# Patient Record
Sex: MALE | Race: WHITE | NOT HISPANIC OR LATINO | Employment: UNEMPLOYED | URBAN - NONMETROPOLITAN AREA
[De-identification: names, ages, dates, MRNs, and addresses within clinical notes are randomized per-mention and may not be internally consistent; named-entity substitution may affect disease eponyms.]

---

## 2024-04-01 ENCOUNTER — EVALUATION (OUTPATIENT)
Dept: PHYSICAL THERAPY | Facility: HOSPITAL | Age: 24
End: 2024-04-01
Payer: COMMERCIAL

## 2024-04-01 DIAGNOSIS — M65.9 SYNOVITIS AND TENOSYNOVITIS, UNSPECIFIED: ICD-10-CM

## 2024-04-01 DIAGNOSIS — G89.29 CHRONIC PAIN OF RIGHT ANKLE: Primary | ICD-10-CM

## 2024-04-01 DIAGNOSIS — M25.571 CHRONIC PAIN OF RIGHT ANKLE: Primary | ICD-10-CM

## 2024-04-01 PROCEDURE — 97110 THERAPEUTIC EXERCISES: CPT | Mod: GP

## 2024-04-01 PROCEDURE — 97162 PT EVAL MOD COMPLEX 30 MIN: CPT | Mod: GP

## 2024-04-01 ASSESSMENT — ENCOUNTER SYMPTOMS
LOSS OF SENSATION IN FEET: 0
DEPRESSION: 0
OCCASIONAL FEELINGS OF UNSTEADINESS: 0

## 2024-04-01 ASSESSMENT — PAIN - FUNCTIONAL ASSESSMENT: PAIN_FUNCTIONAL_ASSESSMENT: 0-10

## 2024-04-01 ASSESSMENT — PAIN SCALES - GENERAL: PAINLEVEL_OUTOF10: 0 - NO PAIN

## 2024-04-01 NOTE — PROGRESS NOTES
Physical Therapy    Physical Therapy Evaluation and Treatment      Patient Name: Jose Mcpherson  MRN: [id number]  Today's Date: 4/1/2024  Time Calculation  Start Time: 1430  Stop Time: 1509  Time Calculation (min): 39 min    Assessment:  PT Assessment  PT Assessment Results: Impaired balance, Decreased strength, Obesity, Pain  Rehab Prognosis: Fair  Evaluation/Treatment Tolerance: Patient tolerated treatment well  Assessment Comment: Patient presenting with higher level strength and balance deficcits of the R ankle. He will benefit from targeted active therapy program to improve tolerance of weightbearing positions.     Plan:  OP PT Plan  Treatment/Interventions: Education/ Instruction, Neuromuscular re-education, Therapeutic activities, Therapeutic exercises  PT Plan: Skilled PT  PT Frequency: 2 times per week  Duration: 4 wks  Plan of Care Agreement: Patient    Current Problem:   1. Chronic pain of right ankle        2. Synovitis and tenosynovitis, unspecified  Referral to Physical Therapy          Subjective    General:  General  Reason for Referral: Eval and treat 2/2 R ankle sprain of 2 years duration.  Referred By: Trina Garrido DPM  Past Medical History Relevant to Rehab: Patient had inversion sprain playing football 2 years ago with typical healing. After he obtained a job he noted some pain in the R lateral ankle with prolonged standing and walking. He sought attention from podiatry and has had a brace and arch supports added. (no longer uses brace). He presents today for an adjunctive strengthening program.  General Comment: Currently unemployed but looking for work.    Precautions:  Precautions  STEADI Fall Risk Score (The score of 4 or more indicates an increased risk of falling): 0    Pain:  Pain Assessment  Pain Assessment: 0-10  Pain Score: 0 - No pain (has no pain until he has been standing a few hours.)  Effect of Pain on Daily Activities: Decreased standing tolerance.    Prior Level of Function:    I    Objective      General Assessments:   Unilateral balance limited to 3 seconds (bilaterally)     Extremity/Trunk Assessments:    ANKLE    Functional Rating Scale     Observation   Pes planus posture. Obese patient.    Ankle Palpation/Joint Mobility Assessment   NTTP R ATFL    Ankle AROM   Full AROM    Ankle PROM   Full PROM    Ankle MMT   4+/5 all MMT    Special Tests   Negative varus stress     Gait   Amb to dept without device, non antalgic, no asymmetry appreciated.    Flexibility   Good gastroc/soleus flexibility.    Outcome Measures:   LEFS: 75!    Treatments:   Patient started on isometric inv/ever     EDUCATION:  Outpatient Education  Education Provided: Anatomy, Body Mechanics, Home Exercise Program (Started on isometric inversion and eversion of the R ankle.)  Risk and Benefits Discussed with Patient/Caregiver/Other: yes  Patient Response to Education: Patient/Caregiver Verbalized Understanding of Information    Goals:  Active       PT Problem       The patient will ambulate 10 minutes continuously without pain increasing to greater than 1/10 to allow return to required community ambulation tasks.        Start:  04/01/24    Expected End:  05/01/24            The patient will improve their time in unipedal stance to >= 10 sec to demonstrate increased balance with it's commensurate improvement in gait safety.        Start:  04/01/24    Expected End:  05/01/24            Patient will perform 10 reps unilateral calf raise to demonstrate functional strength of ankle musculature.       Start:  04/01/24    Expected End:  05/01/24            Increase LEFS to 80       Start:  04/01/24    Expected End:  05/01/24

## 2024-04-01 NOTE — LETTER
April 1, 2024    Trina Garrido DPM  [address] [facility] [address]    Patient: Jose Mcpherson   YOB: 2000   Date of Visit: 4/1/2024       Dear Trina Garrido DPM  [address] [place] [address]    The attached plan of care is being sent to you because your patient’s medical reimbursement requires that you certify the plan of care. Your signature is required to allow uninterrupted insurance coverage.      You may indicate your approval by signing below and faxing this form back to us at Dept Fax: [phone].    Please call Dept: [phone] with any questions or concerns.    Thank you for this referral,        Juan Francis PT  [facility]  [address] [facility] [id number] [phone]    Payer: Payor: [employer] PLAN / Plan: [facility] / Product Type: *No Product type* /                                                                         Date:     Dear Juan Francis PT,     Re: Mr. Jose Mcpherson, MRN:[id number]    I certify that I have reviewed the attached plan of care and it is medically necessary for Mr. Jose Mcpherson (2000) who is under my care.          ______________________________________                    _________________  Provider name and credentials                                           Date and time                                                                                           Plan of Care 4/1/24   Effective from: 4/1/2024  Effective to: 5/1/2024    Plan ID: [id number]            Participants as of Finalize on 4/1/2024    Name Type Comments Contact Info    Trina Garrido DPM Referring Provider  [phone]    Juan Francis PT Physical Therapist  [phone]       Last Plan Note     Author: uJan Francis PT Status: Incomplete Last edited: 4/1/2024  2:30 PM       Physical Therapy    Physical Therapy Evaluation and Treatment      Patient Name: Jose LU  Vida  MRN: [id number]  Today's Date: 4/1/2024  Time Calculation  Start Time: 1430  Stop Time: 1509  Time Calculation (min): 39 min    Assessment:  PT Assessment  PT Assessment Results: Impaired balance, Decreased strength, Obesity, Pain  Rehab Prognosis: Fair  Evaluation/Treatment Tolerance: Patient tolerated treatment well  Assessment Comment: Patient presenting with higher level strength and balance deficcits of the R ankle. He will benefit from targeted active therapy program to improve tolerance of weightbearing positions.     Plan:  OP PT Plan  Treatment/Interventions: Education/ Instruction, Neuromuscular re-education, Therapeutic activities, Therapeutic exercises  PT Plan: Skilled PT  PT Frequency: 2 times per week  Duration: 4 wks  Plan of Care Agreement: Patient    Current Problem:   1. Chronic pain of right ankle        2. Synovitis and tenosynovitis, unspecified  Referral to Physical Therapy          Subjective   General:  General  Reason for Referral: Eval and treat 2/2 R ankle sprain of 2 years duration.  Referred By: Trina Garrido DPM  Past Medical History Relevant to Rehab: Patient had inversion sprain playing football 2 years ago with typical healing. After he obtained a job he noted some pain in the R lateral ankle with prolonged standing and walking. He sought attention from podiatry and has had a brace and arch supports added. (no longer uses brace). He presents today for an adjunctive strengthening program.  General Comment: Currently unemployed but looking for work.    Precautions:  Precautions  STEADI Fall Risk Score (The score of 4 or more indicates an increased risk of falling): 0    Pain:  Pain Assessment  Pain Assessment: 0-10  Pain Score: 0 - No pain (has no pain until he has been standing a few hours.)  Effect of Pain on Daily Activities: Decreased standing tolerance.    Prior Level of Function:   I    Objective     General Assessments:   Unilateral balance limited to 3 seconds  (bilaterally)     Extremity/Trunk Assessments:    ANKLE    Functional Rating Scale     Observation   Pes planus posture. Obese patient.    Ankle Palpation/Joint Mobility Assessment   NTTP R ATFL    Ankle AROM   Full AROM    Ankle PROM   Full PROM    Ankle MMT   4+/5 all MMT    Special Tests   Negative varus stress     Gait   Amb to dept without device, non antalgic, no asymmetry appreciated.    Flexibility   Good gastroc/soleus flexibility.    Outcome Measures:   LEFS: 75!    Treatments:   Patient started on isometric inv/ever     EDUCATION:  Outpatient Education  Education Provided: Anatomy, Body Mechanics, Home Exercise Program (Started on isometric inversion and eversion of the R ankle.)  Risk and Benefits Discussed with Patient/Caregiver/Other: yes  Patient Response to Education: Patient/Caregiver Verbalized Understanding of Information    Goals:  Active       PT Problem       The patient will ambulate 10 minutes continuously without pain increasing to greater than 1/10 to allow return to required community ambulation tasks.        Start:  04/01/24    Expected End:  05/01/24            The patient will improve their time in unipedal stance to >= 10 sec to demonstrate increased balance with it's commensurate improvement in gait safety.        Start:  04/01/24    Expected End:  05/01/24            Patient will perform 10 reps unilateral calf raise to demonstrate functional strength of ankle musculature.       Start:  04/01/24    Expected End:  05/01/24            Increase LEFS to 80       Start:  04/01/24    Expected End:  05/01/24                           Current Participants as of 4/1/2024    Name Type Comments Contact Info    Trina Garrido DPM Referring Provider  [phone]    Signature pending    Juan Franics PT Physical Therapist  [phone]

## 2024-04-03 ENCOUNTER — TREATMENT (OUTPATIENT)
Dept: PHYSICAL THERAPY | Facility: HOSPITAL | Age: 24
End: 2024-04-03
Payer: COMMERCIAL

## 2024-04-03 DIAGNOSIS — G89.29 CHRONIC PAIN OF RIGHT ANKLE: ICD-10-CM

## 2024-04-03 DIAGNOSIS — M25.571 CHRONIC PAIN OF RIGHT ANKLE: ICD-10-CM

## 2024-04-03 PROCEDURE — 97110 THERAPEUTIC EXERCISES: CPT | Mod: GP,CQ

## 2024-04-03 ASSESSMENT — PAIN - FUNCTIONAL ASSESSMENT: PAIN_FUNCTIONAL_ASSESSMENT: 0-10

## 2024-04-03 ASSESSMENT — PAIN SCALES - GENERAL: PAINLEVEL_OUTOF10: 0 - NO PAIN

## 2024-04-03 NOTE — PROGRESS NOTES
Physical Therapy Treatment    Patient Name: Jose Mcpherson  MRN: [id number]  Today's Date: 4/3/2024  Time Calculation  Start Time: 1430  Stop Time: 1510  Time Calculation (min): 40 min        PT Therapeutic Procedures Time Entry  Therapeutic Exercise Time Entry: 40                Insurance:  Visit number: 2 of 8  Authorization info: 20 authorized   Insurance Type: [facility]           Current Problem  1. Chronic pain of right ankle  Follow Up In Physical Therapy          General  Referred By: Trina Garrido DPM      Subjective   Current Condition:   Same  Patient reports that when he is walking on unsteady ground he notices his ankle wants to give out, but doesn't. Will also have more pain when it is shifting to steady.     Performing HEP?: Partially- did not have a ball to do iso metrics, but trying to walk more     Precautions  Precautions  Medical Precautions: No known precautions/limitation  Pain  Pain Assessment: 0-10  Pain Score: 0 - No pain        Treatments:     Pro cycle x10 min with focus on leading rotational movements with ankle joint, seated isolated strengthening with YTB DF/PF/INV/EV 2x10 each, gentle gastroc stretch 5x15 sec, standing calf raises with UE support 2x10, standing toe raises with UE support 2x10, static SLS trials with no UE support, side stepping with each LE leading 4x25ft, ended with ABC ROM        EDUCATION:   Individual(s) Educated: Patient   Education Provided: Body mechanics   Handout(s) Provided: Scanned into chart  Home Program: In previous notes- updated with resistance bands (scanned into chart)  Risk and Benefits Discussed with Patient/Caregiver/Other: Yes   Patient/Caregiver Demonstrated Understanding: Yes   Patient Response to Education: Patient/Caregiver verbalized understanding of information and Patient/Caregiver performed return demonstration of exercises/activities    Assessment:   Patient was able to perform all strengthening and mobility exercises  "well. Reports occasional stiffness and \"clicking\", but no reports of pain. Able to tell his muscles were working. Most issues reported along lateral portion of ankle.         Plan:  Continue with POC and as patient tolerated  Frequency: 2 x Week  Duration: 4 Weeks        Access Code: [email or web address]  URL: [email or web address]  Date: 04/03/2024  Prepared by: Kirsten Caballero    Exercises  - Ankle Dorsiflexion with Resistance  - 1 x daily - 7 x weekly - 3 sets - 10 reps  - Ankle Eversion with Resistance  - 1 x daily - 7 x weekly - 3 sets - 10 reps  - Ankle Inversion with Resistance  - 1 x daily - 7 x weekly - 3 sets - 10 reps  - Ankle and Toe Plantarflexion with Resistance  - 1 x daily - 7 x weekly - 3 sets - 10 reps       Goals:  Active       PT Problem       The patient will ambulate 10 minutes continuously without pain increasing to greater than 1/10 to allow return to required community ambulation tasks.        Start:  04/01/24    Expected End:  05/01/24            The patient will improve their time in unipedal stance to >= 10 sec to demonstrate increased balance with it's commensurate improvement in gait safety.        Start:  04/01/24    Expected End:  05/01/24            Patient will perform 10 reps unilateral calf raise to demonstrate functional strength of ankle musculature.       Start:  04/01/24    Expected End:  05/01/24            Increase LEFS to 80       Start:  04/01/24    Expected End:  05/01/24                 Kirsten Raphael, PTA  "

## 2024-04-10 ENCOUNTER — TREATMENT (OUTPATIENT)
Dept: PHYSICAL THERAPY | Facility: HOSPITAL | Age: 24
End: 2024-04-10
Payer: COMMERCIAL

## 2024-04-10 DIAGNOSIS — G89.29 CHRONIC PAIN OF RIGHT ANKLE: ICD-10-CM

## 2024-04-10 DIAGNOSIS — M25.571 CHRONIC PAIN OF RIGHT ANKLE: ICD-10-CM

## 2024-04-10 PROCEDURE — 97110 THERAPEUTIC EXERCISES: CPT | Mod: GP

## 2024-04-10 ASSESSMENT — PAIN SCALES - GENERAL: PAINLEVEL_OUTOF10: 0 - NO PAIN

## 2024-04-10 ASSESSMENT — PAIN - FUNCTIONAL ASSESSMENT: PAIN_FUNCTIONAL_ASSESSMENT: 0-10

## 2024-04-10 NOTE — PROGRESS NOTES
Physical Therapy    Physical Therapy Treatment    Patient Name: Jose Mcpherson  MRN: [id number]  Today's Date: 4/10/2024  Time Calculation  Start Time: 1430  Stop Time: 1508  Time Calculation (min): 38 min      Assessment:  PT Assessment  Assessment Comment: Patient tolerated significant increase in program without issues.    Plan:  OP PT Plan  PT Plan: Skilled PT (3/8)    Current Problem  1. Chronic pain of right ankle  Follow Up In Physical Therapy          General  PT  Visit  PT Received On: 04/10/24  Response to Previous Treatment: Patient with no complaints from previous session.  General  General Comment: Feeling a little stronger already.    Subjective    Pain  Pain Assessment  Pain Assessment: 0-10  Pain Score: 0 - No pain    Objective   Treatments:   Pro cycle x10 min @ 20W, Gastroc/soleus stretch couplet 3x30 sec each, standing calf raises at bars 3x10, X pattern unilateral stance drill 3x10x4#, BTB derotation unilateral stance drill 3x5 each direction, lateral step up without UE support 3x10, Ladder agility passes forward/lateral and lateral drop drills 2 walk and 2 light jog passes each.      OP EDUCATION:       Goals:  Active       PT Problem       The patient will ambulate 10 minutes continuously without pain increasing to greater than 1/10 to allow return to required community ambulation tasks.        Start:  04/01/24    Expected End:  05/01/24            The patient will improve their time in unipedal stance to >= 10 sec to demonstrate increased balance with it's commensurate improvement in gait safety.        Start:  04/01/24    Expected End:  05/01/24            Patient will perform 10 reps unilateral calf raise to demonstrate functional strength of ankle musculature.       Start:  04/01/24    Expected End:  05/01/24            Increase LEFS to 80       Start:  04/01/24    Expected End:  05/01/24

## 2024-04-12 ENCOUNTER — TREATMENT (OUTPATIENT)
Dept: PHYSICAL THERAPY | Facility: HOSPITAL | Age: 24
End: 2024-04-12
Payer: COMMERCIAL

## 2024-04-12 DIAGNOSIS — G89.29 CHRONIC PAIN OF RIGHT ANKLE: ICD-10-CM

## 2024-04-12 DIAGNOSIS — M25.571 CHRONIC PAIN OF RIGHT ANKLE: ICD-10-CM

## 2024-04-12 PROCEDURE — 97110 THERAPEUTIC EXERCISES: CPT | Mod: GP

## 2024-04-12 NOTE — PROGRESS NOTES
Physical Therapy    Physical Therapy Treatment    Patient Name: Jose Mcpherson  MRN: [id number]  Today's Date: 4/12/2024  Time Calculation  Start Time: 1425  Stop Time: 1506  Time Calculation (min): 41 min      Assessment:  PT Assessment  Assessment Comment: Patient continues to build activity tolerance without c/o.  Plan:  OP PT Plan  PT Plan: Skilled PT (4/8)    Current Problem  1. Chronic pain of right ankle  Follow Up In Physical Therapy          General  PT  Visit  PT Received On: 04/12/24  Response to Previous Treatment: Patient with no complaints from previous session.  General  General Comment: non antalgic gait on arrival today.    Subjective    Pain   No c/o    Objective   Treatments:  Therapeutic Exercise  Therapeutic Exercise Performed: Yes  Pro cycle x10 min @ 20W, Gastroc/soleus stretch couplet 3x30 sec each, standing calf raises at bars 3x10, X pattern unilateral stance drill 3x10x4#, BTB derotation unilateral stance press out drill 3x10 each direction, lateral step up without UE support 3x10, Ladder agility passes forward/lateral and lateral drop drills 4 light jog passes each. Added TM cooldown 5 min@ 2MPH.      OP EDUCATION:       Goals:  Active       PT Problem       The patient will ambulate 10 minutes continuously without pain increasing to greater than 1/10 to allow return to required community ambulation tasks.        Start:  04/01/24    Expected End:  05/01/24            The patient will improve their time in unipedal stance to >= 10 sec to demonstrate increased balance with it's commensurate improvement in gait safety.        Start:  04/01/24    Expected End:  05/01/24            Patient will perform 10 reps unilateral calf raise to demonstrate functional strength of ankle musculature.       Start:  04/01/24    Expected End:  05/01/24            Increase LEFS to 80       Start:  04/01/24    Expected End:  05/01/24

## 2024-04-16 ENCOUNTER — TREATMENT (OUTPATIENT)
Dept: PHYSICAL THERAPY | Facility: HOSPITAL | Age: 24
End: 2024-04-16
Payer: COMMERCIAL

## 2024-04-16 DIAGNOSIS — M25.571 CHRONIC PAIN OF RIGHT ANKLE: ICD-10-CM

## 2024-04-16 DIAGNOSIS — G89.29 CHRONIC PAIN OF RIGHT ANKLE: ICD-10-CM

## 2024-04-16 PROCEDURE — 97110 THERAPEUTIC EXERCISES: CPT | Mod: GP

## 2024-04-16 ASSESSMENT — PAIN SCALES - GENERAL: PAINLEVEL_OUTOF10: 0 - NO PAIN

## 2024-04-16 ASSESSMENT — PAIN - FUNCTIONAL ASSESSMENT: PAIN_FUNCTIONAL_ASSESSMENT: 0-10

## 2024-04-16 NOTE — PROGRESS NOTES
Physical Therapy    Physical Therapy Treatment    Patient Name: Jose Mcpherson  MRN: [id number]  Today's Date: 4/16/2024  Time Calculation  Start Time: 1430  Stop Time: 1514  Time Calculation (min): 44 min      Assessment:  PT Assessment  Assessment Comment: Increasing volume and intensity of active program with no issues or pain complaints.    Plan:  OP PT Plan  PT Plan: Skilled PT (5/8)    Current Problem  1. Chronic pain of right ankle  Follow Up In Physical Therapy          General  PT  Visit  PT Received On: 04/16/24  Response to Previous Treatment: Patient with no complaints from previous session.       Subjective    Pain  Pain Assessment  Pain Assessment: 0-10  Pain Score: 0 - No pain    Objective   Treatments:  Therapeutic Exercise  Therapeutic Exercise Performed: Yes  Physiostep x10 min @ 20W, Gastroc/soleus stretch couplet 3x30 sec each, standing unilateral calf raises at bars 3x10, X pattern unilateral stance drill 3x10x4#, BTB derotation unilateral stance press out drill 3x10 each direction, lateral step up without UE support 1x30, Ladder agility passes forward/lateral and lateral drop drills 6 light jog passes each.     OP EDUCATION:       Goals:  Active       PT Problem       The patient will ambulate 10 minutes continuously without pain increasing to greater than 1/10 to allow return to required community ambulation tasks.        Start:  04/01/24    Expected End:  05/01/24            The patient will improve their time in unipedal stance to >= 10 sec to demonstrate increased balance with it's commensurate improvement in gait safety.        Start:  04/01/24    Expected End:  05/01/24            Patient will perform 10 reps unilateral calf raise to demonstrate functional strength of ankle musculature.       Start:  04/01/24    Expected End:  05/01/24            Increase LEFS to 80       Start:  04/01/24    Expected End:  05/01/24

## 2024-04-18 ENCOUNTER — TREATMENT (OUTPATIENT)
Dept: PHYSICAL THERAPY | Facility: HOSPITAL | Age: 24
End: 2024-04-18
Payer: COMMERCIAL

## 2024-04-18 DIAGNOSIS — G89.29 CHRONIC PAIN OF RIGHT ANKLE: ICD-10-CM

## 2024-04-18 DIAGNOSIS — M25.571 CHRONIC PAIN OF RIGHT ANKLE: ICD-10-CM

## 2024-04-18 PROCEDURE — 97110 THERAPEUTIC EXERCISES: CPT | Mod: GP,CQ

## 2024-04-18 ASSESSMENT — PAIN SCALES - GENERAL: PAINLEVEL_OUTOF10: 0 - NO PAIN

## 2024-04-18 ASSESSMENT — PAIN - FUNCTIONAL ASSESSMENT: PAIN_FUNCTIONAL_ASSESSMENT: 0-10

## 2024-04-18 NOTE — PROGRESS NOTES
Physical Therapy Treatment    Patient Name: Jose Mcpherson  MRN: [id number]  Today's Date: 4/18/2024  Time Calculation  Start Time: 0812  Stop Time: 0851  Time Calculation (min): 39 min        PT Therapeutic Procedures Time Entry  Therapeutic Exercise Time Entry: 39                Insurance:  Visit number: 6 of 8  Authorization info: 20 authorized   Insurance Type: Replaced by [facility]           Current Problem  1. Chronic pain of right ankle  Follow Up In Physical Therapy          General  Referred By: Trina Garrido DPM      Subjective   Current Condition:   Better    Patient reports that last night he was outside and stepped in mud that caused him to slip and partially fall. Left leg gave out on him in that moment, but states he caught himself in a lunge position. Afterwards noticed some increased discomfort in R ankle when walking on unsteady ground, has not noticed any out of the ordinary swelling.   Does report feeling like his balance has improved when he is walking though    Performing HEP?: Yes    Precautions  Precautions  Medical Precautions: No known precautions/limitation  Pain  Pain Assessment: 0-10  Pain Score: 0 - No pain        Treatments:     Therapeutic Exercise  Therapeutic Exercise Performed: Yes  Physiostep x10 min @ 20W, Gastroc/soleus stretch couplet 3x30 sec each, standing unilateral calf raises at bars 3x10, X pattern unilateral stance drill 3x10x4#, BTB derotation unilateral stance press out drill 3x10 each direction, lateral step up without UE support 1x30, Ladder agility passes forward/lateral and lateral drop drills 6 light jog passes each.         EDUCATION:   Individual(s) Educated: Patient   Education Provided: Body Mechanics/Muscle fatigue  Handout(s) Provided: Scanned into chart  Home Program: In previous notes  Risk and Benefits Discussed with Patient/Caregiver/Other: Yes   Patient/Caregiver Demonstrated Understanding: Yes   Patient Response to Education: Patient/Caregiver  verbalized understanding of information    Assessment:   Patient reports muscle burning feeling in R calf during single leg stance activities and states it causes him to loose his balance. Extended time to perform unilateral activities d/t frequent re setting of balance. Overall patient challenged by increased volume/intensity of rehab program. States he is feeling better than when he arrived today         Plan:  Continue with POC and As per patient tolerated to improve ability to complete functional tasks  Frequency: 2 x Week  Duration: 4 Weeks       Goals:  Active       PT Problem       The patient will ambulate 10 minutes continuously without pain increasing to greater than 1/10 to allow return to required community ambulation tasks.        Start:  04/01/24    Expected End:  05/01/24            The patient will improve their time in unipedal stance to >= 10 sec to demonstrate increased balance with it's commensurate improvement in gait safety.        Start:  04/01/24    Expected End:  05/01/24            Patient will perform 10 reps unilateral calf raise to demonstrate functional strength of ankle musculature.       Start:  04/01/24    Expected End:  05/01/24            Increase LEFS to 80       Start:  04/01/24    Expected End:  05/01/24                 Kirsten Raphael, PTA

## 2024-04-22 ENCOUNTER — CLINICAL SUPPORT (OUTPATIENT)
Dept: PHYSICAL THERAPY | Facility: HOSPITAL | Age: 24
End: 2024-04-22
Payer: COMMERCIAL

## 2024-04-22 DIAGNOSIS — G89.29 CHRONIC PAIN OF RIGHT ANKLE: ICD-10-CM

## 2024-04-22 DIAGNOSIS — M25.571 CHRONIC PAIN OF RIGHT ANKLE: ICD-10-CM

## 2024-04-22 PROCEDURE — 97110 THERAPEUTIC EXERCISES: CPT | Mod: GP

## 2024-04-22 ASSESSMENT — PAIN SCALES - GENERAL: PAINLEVEL_OUTOF10: 0 - NO PAIN

## 2024-04-22 ASSESSMENT — PAIN - FUNCTIONAL ASSESSMENT: PAIN_FUNCTIONAL_ASSESSMENT: 0-10

## 2024-04-22 NOTE — PROGRESS NOTES
Physical Therapy    Physical Therapy Treatment    Patient Name: Jose Mcpherson  MRN: [id number]  Today's Date: 4/22/2024  Time Calculation  Start Time: 1125  Stop Time: 1208  Time Calculation (min): 43 min      Assessment:  PT Assessment  Assessment Comment: Patient able to replicate last ankle rehab session with modest increases and no pain today. Continues to display significant difficulty with unilateral balance tasks.    Plan:  OP PT Plan  PT Plan: Skilled PT (7/8)    Current Problem  1. Chronic pain of right ankle  Follow Up In Physical Therapy          General  PT  Visit  PT Received On: 04/22/24  General  General Comment: Ankle has been feeling really good.    Subjective    Pain  Pain Assessment  Pain Assessment: 0-10  Pain Score: 0 - No pain    Objective     Activity Tolerance:   Significantly improved.    Treatments:  Therapeutic Exercise  Therapeutic Exercise Performed: Yes  Physiostep x10 min @ 40 W, Gastroc/soleus stretch couplet 3x30 sec each, standing unilateral calf raises at bars 3x10, X pattern unilateral stance drill 3x10x4#, BTB derotation unilateral stance press out drill 3x10 each direction, lateral step up with ball pass at apex 1x30, Ladder agility passes forward/lateral and lateral drop drills 6 light jog passes each.     OP EDUCATION:       Goals:  Active       PT Problem       The patient will ambulate 10 minutes continuously without pain increasing to greater than 1/10 to allow return to required community ambulation tasks.        Start:  04/01/24    Expected End:  05/01/24            The patient will improve their time in unipedal stance to >= 10 sec to demonstrate increased balance with it's commensurate improvement in gait safety.        Start:  04/01/24    Expected End:  05/01/24            Patient will perform 10 reps unilateral calf raise to demonstrate functional strength of ankle musculature.       Start:  04/01/24    Expected End:  05/01/24            Increase LEFS to 80        Start:  04/01/24    Expected End:  05/01/24

## 2024-04-25 ENCOUNTER — DOCUMENTATION (OUTPATIENT)
Dept: PHYSICAL THERAPY | Facility: HOSPITAL | Age: 24
End: 2024-04-25

## 2024-04-25 ENCOUNTER — TREATMENT (OUTPATIENT)
Dept: PHYSICAL THERAPY | Facility: HOSPITAL | Age: 24
End: 2024-04-25
Payer: COMMERCIAL

## 2024-04-25 DIAGNOSIS — G89.29 CHRONIC PAIN OF RIGHT ANKLE: ICD-10-CM

## 2024-04-25 DIAGNOSIS — M25.571 CHRONIC PAIN OF RIGHT ANKLE: ICD-10-CM

## 2024-04-25 PROCEDURE — 97110 THERAPEUTIC EXERCISES: CPT | Mod: GP

## 2024-04-25 ASSESSMENT — PAIN - FUNCTIONAL ASSESSMENT: PAIN_FUNCTIONAL_ASSESSMENT: 0-10

## 2024-04-25 ASSESSMENT — PAIN SCALES - GENERAL: PAINLEVEL_OUTOF10: 0 - NO PAIN

## 2024-04-25 NOTE — PROGRESS NOTES
Physical Therapy    Discharge Summary    Name: Jose Mcpherson  MRN: [id number]  : 2000  Date: 24    Discharge Summary: PT    Discharge Information: Date of discharge , Date of last visit , and Referred for remote R ankle sprain    Rehab Discharge Reason: Achieved all and/or the most significant goals(s)

## 2024-04-25 NOTE — PROGRESS NOTES
Physical Therapy    Physical Therapy Treatment    Patient Name: Jose Mcpherson  MRN: [id number]  Today's Date: 4/25/2024  Time Calculation  Start Time: 1515  Stop Time: 1559  Time Calculation (min): 44 min      Assessment:  PT Assessment  Assessment Comment: Patient met goals set on IE. He is not demonstrating any gait asymmetry and is performing all ADL ambulatory tasks without difficulty.  Plan:  OP PT Plan  PT Plan: No Additional PT interventions required at this time    Current Problem  1. Chronic pain of right ankle  Follow Up In Physical Therapy          General  PT  Visit  PT Received On: 04/25/24  General  General Comment: Doing well. No pain issues.    Subjective    Pain  Pain Assessment  Pain Assessment: 0-10  Pain Score: 0 - No pain    Objective   Activity Tolerance:   Performs full program without pain complaint.    Outcome Measures:  LEFS 72    Treatments:  Therapeutic Exercise  Therapeutic Exercise Performed: YesPhysiostep x10 min @ 40 W, Gastroc/soleus stretch couplet 3x30 sec each, standing unilateral calf raises at bars 3x10, X pattern unilateral stance drill 3x10x4#, BTB derotation unilateral stance press out drill 3x10 each direction, lateral step up with ball pass at apex 1x30, Ladder agility passes forward/lateral and lateral drop drills 6 light jog passes each.     OP EDUCATION:   Instructed patient to continue with his higher level activities to maintain his progress.    Goals:  Resolved       PT Problem       The patient will ambulate 10 minutes continuously without pain increasing to greater than 1/10 to allow return to required community ambulation tasks.  (Met)       Start:  04/01/24    Expected End:  05/01/24    Resolved:  04/25/24         The patient will improve their time in unipedal stance to >= 10 sec to demonstrate increased balance with it's commensurate improvement in gait safety.  (Met)       Start:  04/01/24    Expected End:  05/01/24    Resolved:  04/25/24         Patient will  perform 10 reps unilateral calf raise to demonstrate functional strength of ankle musculature. (Met)       Start:  04/01/24    Expected End:  05/01/24    Resolved:  04/25/24         Increase LEFS to 80 (Adequate for Discharge)       Start:  04/01/24    Expected End:  05/01/24    Resolved:  04/25/24